# Patient Record
Sex: MALE | URBAN - METROPOLITAN AREA
[De-identification: names, ages, dates, MRNs, and addresses within clinical notes are randomized per-mention and may not be internally consistent; named-entity substitution may affect disease eponyms.]

---

## 2019-07-15 ENCOUNTER — OFFICE VISIT (OUTPATIENT)
Dept: URBAN - METROPOLITAN AREA CLINIC 33 | Facility: CLINIC | Age: 29
End: 2019-07-15
Payer: COMMERCIAL

## 2019-07-15 DIAGNOSIS — H53.2 DIPLOPIA: Primary | ICD-10-CM

## 2019-07-15 PROCEDURE — 99213 OFFICE O/P EST LOW 20 MIN: CPT | Performed by: OPTOMETRIST

## 2019-07-15 ASSESSMENT — INTRAOCULAR PRESSURE
OS: 15
OD: 16

## 2019-07-15 ASSESSMENT — KERATOMETRY
OD: 45.38
OS: 44.88

## 2019-07-15 NOTE — IMPRESSION/PLAN
Impression: Diplopia: H53.2. Plan: Advised patient of condition. Discussed diagnosis in detail with patient. Discussed condition might be temporary due to . Advised patient to use ATs for comfort. Discussed heading to an ER if patient notices severe headache, eyelid ptosis, numbness to face or scalp. 

RTC for double vision follow up without dilation

## 2024-03-08 ENCOUNTER — OFFICE VISIT (OUTPATIENT)
Dept: URBAN - METROPOLITAN AREA CLINIC 33 | Facility: CLINIC | Age: 34
End: 2024-03-08
Payer: COMMERCIAL

## 2024-03-08 DIAGNOSIS — H57.13 OCULAR PAIN, BILATERAL: Primary | ICD-10-CM

## 2024-03-08 PROCEDURE — 92002 INTRM OPH EXAM NEW PATIENT: CPT | Performed by: OPHTHALMOLOGY

## 2024-03-08 ASSESSMENT — INTRAOCULAR PRESSURE
OD: 15
OS: 15